# Patient Record
(demographics unavailable — no encounter records)

---

## 2024-12-03 NOTE — PHYSICAL EXAM
[Erythema] : erythema [Rales] : rales [NL] : warm, clear [Purulent Effusion] : no purulent effusion [Erythematous Oropharynx] : nonerythematous oropharynx [FreeTextEntry7] : lingular pneumonia

## 2024-12-03 NOTE — HISTORY OF PRESENT ILLNESS
[de-identified] : persistent cough [FreeTextEntry6] : patient has had a 3 week history of cough worse at night no fevers no respiratory distress seen at  with diagnosis of OM and possible bronchitis placed on amoxicillin which he refuses to take in liuid form owing to sensory issues.

## 2024-12-03 NOTE — DISCUSSION/SUMMARY
[FreeTextEntry1] : lingular pneumonia will Rx a Zpak have to work with the pill form owing to his sensory issues follow up 7-10 days

## 2024-12-03 NOTE — REVIEW OF SYSTEMS
[Cough] : cough [Negative] : Genitourinary [Fever] : no fever [Tachypnea] : not tachypneic [Wheezing] : no wheezing

## 2025-01-16 NOTE — HISTORY OF PRESENT ILLNESS
[de-identified] : rash [FreeTextEntry6] : 6 year old with facial and body rash since this AM mild itch no fever no cough s/p pneumonia in December

## 2025-01-16 NOTE — PHYSICAL EXAM
[NL] : moves all extremities x4, warm, well perfused x4 [Erythematous] : erythematous [Maculopapular Eruption] : maculopapular eruption [Papules] : papules [de-identified] : raised type rash to face and extensor shoulders, arms and legs

## 2025-01-16 NOTE — DISCUSSION/SUMMARY
[FreeTextEntry1] : Fifth's disease is caused by Parvovirus. In general it is a benign condition. Children are contagious 1-2 weeks prior to rash when they may present with a cold or low grade fever. The rash may recur for up up to 6 weeks and extremes in temperature bring it out. Occasionally, adults may get 5th disease but manifest with arthalgias/arthritis. Women who are pregnant in first trimester and cannot recall having this, should consult their ob/gyn.

## 2025-02-19 NOTE — REVIEW OF SYSTEMS
Addended Teetee Del Rio on: 2/14/2018 10:56 AM     Modules accepted: Orders [Ear Pain] : ear pain [Negative] : Genitourinary [Fever] : no fever [Headache] : no headache [Eye Discharge] : no eye discharge [Eye Redness] : no eye redness [Nasal Discharge] : no nasal discharge [Nasal Congestion] : no nasal congestion

## 2025-02-19 NOTE — DISCUSSION/SUMMARY
[FreeTextEntry1] : no evidence of OM or anything else observation only advised reddened earlobes not indicative of OM

## 2025-02-19 NOTE — HISTORY OF PRESENT ILLNESS
[de-identified] : ears bothering him [FreeTextEntry6] : patient has been touching his ears to his shoulders frequently earlobes reddish mom suspects his ears are bothering him no fever no URI

## 2025-02-24 NOTE — DISCUSSION/SUMMARY
[FreeTextEntry1] : Complete antibiotic course. Potential side effect of antibiotics includes but not limited to diarrhea. Provide ibuprofen as needed for pain or fever. If no improvement within 48 hours return for re-evaluation. Follow up in 2-3 wks prefers pills saline motrin

## 2025-02-24 NOTE — HISTORY OF PRESENT ILLNESS
[de-identified] : congested [FreeTextEntry6] : congested past few days no fever c/o L ear pain rash to l cheek

## 2025-05-28 NOTE — DISCUSSION/SUMMARY
[FreeTextEntry1] : will treat back lesion for presumptive tinea corporis  two lesions under mouth continue the aquafor also try vitamin E

## 2025-05-28 NOTE — PHYSICAL EXAM
[NL] : moves all extremities x4, warm, well perfused x4 [de-identified] : one oval lesion with red periphery to rt upper back and 2 red spots to each side of mouth inferiorly Labs/EKG

## 2025-05-28 NOTE — HISTORY OF PRESENT ILLNESS
[de-identified] : skin lesion/ringworm [FreeTextEntry6] : has lesion to right upper back under neck red spots to face under mouth apply moisturizers that seem to help then it recurs with his tongue licking to these areas

## 2025-06-24 NOTE — PHYSICAL EXAM
[Alert] : alert [No Acute Distress] : no acute distress [Normocephalic] : normocephalic [Conjunctivae with no discharge] : conjunctivae with no discharge [PERRL] : PERRL [EOMI Bilateral] : EOMI bilateral [Auricles Well Formed] : auricles well formed [Clear Tympanic membranes with present light reflex and bony landmarks] : clear tympanic membranes with present light reflex and bony landmarks [No Discharge] : no discharge [Nares Patent] : nares patent [Pink Nasal Mucosa] : pink nasal mucosa [Palate Intact] : palate intact [Nonerythematous Oropharynx] : nonerythematous oropharynx [Supple, full passive range of motion] : supple, full passive range of motion [No Palpable Masses] : no palpable masses [Symmetric Chest Rise] : symmetric chest rise [Clear to Auscultation Bilaterally] : clear to auscultation bilaterally [Regular Rate and Rhythm] : regular rate and rhythm [Normal S1, S2 present] : normal S1, S2 present [No Murmurs] : no murmurs [+2 Femoral Pulses] : +2 femoral pulses [Soft] : soft [NonTender] : non tender [Non Distended] : non distended [Normoactive Bowel Sounds] : normoactive bowel sounds [No Hepatomegaly] : no hepatomegaly [No Splenomegaly] : no splenomegaly [Krishna: ____] : Krishna [unfilled] [Krishna: _____] : Krishna [unfilled] [Circumcised] : circumcised [Central Urethral Opening] : central urethral opening [Testicles Descended Bilaterally] : testicles descended bilaterally [Patent] : patent [No fissures] : no fissures [No Abnormal Lymph Nodes Palpated] : no abnormal lymph nodes palpated [No Gait Asymmetry] : no gait asymmetry [No pain or deformities with palpation of bone, muscles, joints] : no pain or deformities with palpation of bone, muscles, joints [Normal Muscle Tone] : normal muscle tone [Straight] : straight [+2 Patella DTR] : +2 patella DTR [Cranial Nerves Grossly Intact] : cranial nerves grossly intact [No Rash or Lesions] : no rash or lesions

## 2025-06-24 NOTE — HISTORY OF PRESENT ILLNESS
[Mother] : mother [Fruit] : fruit [Grains] : grains [Eggs] : eggs [Dairy] : dairy [Eats meals with family] : eats meals with family [Normal] : Normal [In own bed] : In own bed [Sleeps ___ hours per night] : sleeps [unfilled] hours per night [Brushing teeth twice/d] : brushing teeth twice per day [Yes] : Patient goes to dentist yearly [Appropiate parent-child-sibling interaction] : appropriate parent-child-sibling interaction [Has Friends] : has friends [Grade ___] : Grade [unfilled] [Adequate behavior] : adequate behavior [Adequate performance] : adequate performance [Adequate attention] : adequate attention [No] : No cigarette smoke exposure [Appropriately restrained in motor vehicle] : appropriately restrained in motor vehicle [Supervised outdoor play] : supervised outdoor play [Supervised around water] : supervised around water [Wears helmet and pads] : wears helmet and pads [Parent knows child's friends] : parent knows child's friends [Parent discusses safety rules regarding adults] : parent discusses safety rules regarding adults [Monitored computer use] : monitored computer use [Family discusses home emergency plan] : family discusses home emergency plan [Up to date] : Up to date [NO] : No [Exposure to electronic nicotine delivery system] : No exposure to electronic nicotine delivery system [de-identified] : upcoming evaluation for a diagnostic clarification [de-identified] : too carb heavy usual rice pasta etc [FreeTextEntry9] : soccer swim [de-identified] : receives OT and ST

## 2025-06-24 NOTE — DISCUSSION/SUMMARY
[Normal Growth] : growth [None] : No known medical problems [No Elimination Concerns] : elimination [No Feeding Concerns] : feeding [No Skin Concerns] : skin [Normal Sleep Pattern] : sleep [Delayed Fine Motor Skills] : delayed fine motor skills [Delayed Language Skills] : delayed language skills [No Medications] : ~He/She~ is not on any medications [Patient] : patient [Full Activity without restrictions including Physical Education & Athletics] : Full Activity without restrictions including Physical Education & Athletics [FreeTextEntry1] : Continue balanced diet with all food groups. Brush teeth twice a day with toothbrush. Recommend visit to dentist. Help child to maintain consistent daily routines and sleep schedule. Personal hygiene and puberty explained. School discussed. Ensure home is safe. Teach child about personal safety. Use consistent, positive discipline. Limit screen time to no more than 2 hours per day. Encourage physical activity. no TB risk summer precautions will be evaluated shortly for diagnostic purposes likely Autism spectrum and ADHD awaiting formative diagnosis BMI exceeds the designated norms.strict attention must be paid to portion control waiting 20 mins after initial plate finished before requesting a refill;avoidance of sugars sweetened beverages fast foods and processed foods. insure that regular exercise is done;avoid sedentary existence. Return 1 year for routine well child check.